# Patient Record
Sex: FEMALE | Race: WHITE | NOT HISPANIC OR LATINO | Employment: OTHER | URBAN - METROPOLITAN AREA
[De-identification: names, ages, dates, MRNs, and addresses within clinical notes are randomized per-mention and may not be internally consistent; named-entity substitution may affect disease eponyms.]

---

## 2017-01-13 ENCOUNTER — ALLSCRIPTS OFFICE VISIT (OUTPATIENT)
Dept: OTHER | Facility: OTHER | Age: 82
End: 2017-01-13

## 2017-02-11 ENCOUNTER — OFFICE VISIT (OUTPATIENT)
Dept: URGENT CARE | Facility: MEDICAL CENTER | Age: 82
End: 2017-02-11
Payer: MEDICARE

## 2017-02-11 PROCEDURE — G0463 HOSPITAL OUTPT CLINIC VISIT: HCPCS

## 2017-02-11 PROCEDURE — 99213 OFFICE O/P EST LOW 20 MIN: CPT

## 2017-04-13 DIAGNOSIS — E78.5 HYPERLIPIDEMIA: ICD-10-CM

## 2017-04-13 DIAGNOSIS — I48.91 ATRIAL FIBRILLATION (HCC): ICD-10-CM

## 2017-04-13 DIAGNOSIS — I10 ESSENTIAL (PRIMARY) HYPERTENSION: ICD-10-CM

## 2017-07-24 ENCOUNTER — ALLSCRIPTS OFFICE VISIT (OUTPATIENT)
Dept: OTHER | Facility: OTHER | Age: 82
End: 2017-07-24

## 2017-09-21 ENCOUNTER — TRANSCRIBE ORDERS (OUTPATIENT)
Dept: ADMINISTRATIVE | Facility: HOSPITAL | Age: 82
End: 2017-09-21

## 2017-09-21 DIAGNOSIS — Z12.31 VISIT FOR SCREENING MAMMOGRAM: Primary | ICD-10-CM

## 2017-10-19 ENCOUNTER — HOSPITAL ENCOUNTER (OUTPATIENT)
Dept: RADIOLOGY | Age: 82
Discharge: HOME | End: 2017-10-19
Payer: MEDICARE

## 2017-10-19 DIAGNOSIS — Z12.31 VISIT FOR SCREENING MAMMOGRAM: ICD-10-CM

## 2017-10-19 PROCEDURE — G0202 SCR MAMMO BI INCL CAD: HCPCS

## 2018-01-11 NOTE — PROGRESS NOTES
Assessment  Assessed    1  Atrial fibrillation (427 31) (I48 91)   2  Hyperlipidemia (272 4) (E78 5)   3  Hypertension (401 9) (I10)    Plan  Atrial fibrillation, Hyperlipidemia, Hypertension    · Follow-up visit in 6 months Evaluation and Treatment  Follow-up  Status: Hold For -  Scheduling  Requested for: 21Jan2016   Ordered; For: Atrial fibrillation, Hyperlipidemia, Hypertension; Ordered By: Delio Casillas Performed:  Due: [id number]    Discussion/Summary  Cardiology Discussion Summary Free Text Note Form [facility]:   I will continue his present medical regimen  I have asked her to call me if there is a problem in the interim  I will see her in followup in 6 months time  Chief Complaint  Chief Complaint Free Text Note Form: Pt  here for 6 month cardiology followup  Pt  has occasional SOB-while in bed seems to occur mostly and occasional lightheadedness  No other cardiac complaints  History of Present Illness  Cardiology HPI Free Text Note Form [facility]: Patient is here for followup visit  She was last seen by me in July  Since that time she has done quite well  She recently had carpal tunnel surgery and trigger finger surgery  She recovered very nicely from this  Her recent lipid profile looked quite good  Atrial Fibrillation (Follow-Up): The patient presents with paroxysmal atrial fibrillation  The treatment strategy for this patient is rate control  She states her atrial fibrillation has been well controlled since the last visit  Symptoms:   Hyperlipidemia (Follow-Up): The patient states her hyperlipidemia has been under good control since the last visit  She has no significant interval events  Symptoms: The patient is currently asymptomatic  Hypertension (Follow-Up): The patient presents for follow-up of essential hypertension  The patient states she has been doing well with her blood pressure control since the last visit  She has no significant interval events     Symptoms: The patient is currently asymptomatic  Review of Systems  Cardiology Female ROS:     Cardiac: No complaints of chest pain, no palpitations, no fainting  Skin: No complaints of nonhealing sores or skin rash  Genitourinary: No complaints of recurrent urinary tract infections, frequent urination at night, difficult urination, blood in urine, kidney stones, loss of bladder control, kidney problems, denies any birth control or hormone replacement, is not post menopausal, not currently pregnant  Psychological: No complaints of feeling depressed, anxiety, panic attacks, or difficulty concentrating  General: No complaints of trouble sleeping, lack of energy, fatigue, appetite changes, weight changes, fever, frequent infections, or night sweats  Respiratory: No complaints of shortness of breath, cough with sputum, or wheezing  HEENT: No complaints of serious problems, hearing problems, nose problems, throat problems, or snoring  Gastrointestinal: No complaints of liver problems, nausea, vomiting, heartburn, constipation, bloody stools, diarrhea, problems swallowing, adbominal pain, or rectal bleeding  Hematologic: No complaints of bleeding disorders, anemia, blood clots, or excessive brusing  Neurological: No complaints of numbness, tingling, dizziness, weakness, seizures, headaches, syncope or fainting, AM fatigue, daytime sleepiness, no witnessed apnea episodes  Musculoskeletal: No complaints of arthritis, back pain, or painfull swelling  Active Problems  Problems    1  Aortic valve disease (424 1) (I35 9)   2  Atrial fibrillation (427 31) (I48 91)   3  Esophageal reflux (530 81) (K21 9)   4  Hiatal hernia (553 3) (K44 9)   5  Hyperlipidemia (272 4) (E78 5)   6  Hypertension (401 9) (I10)    Past Medical History  Active Problems And Past Medical History Reviewed: The active problems and past medical history were reviewed and updated today  Surgical History  Problems    1  History of Appendectomy   2  History of Ovarian Cystectomy    Family History  Mother    1  Family history of hypertension (V17 49) (Z82 49)  Father    2  Family history of asthma (V17 5) (Z82 5)  Sister    3  Family history of hypertension (V17 49) (Z82 49)    Social History  Problems    · Never a smoker    Current Meds   1  Aspirin 81 MG Oral Tablet; TAKE 1 TABLET DAILY; Therapy: (Charbel Santos) to Recorded   2  Daily Multivitamin TABS; Therapy: (Charbel Santos) to Recorded   3  Metoprolol Succinate ER 50 MG Oral Tablet Extended Release 24 Hour; TAKE 50 MG   Every morning 25mg PM TDD:75MG  Requested for: [id number]; Last Rx:22Jun2015   Ordered   4  Protonix 40 MG Oral Tablet Delayed Release; TAKE 1 TABLET  PRN; Therapy: (Charbel Santos) to Recorded   5  Simvastatin 10 MG Oral Tablet; TAKE 1 TABLET Every other day Mon-wed-fri  Requested   for: [id number]; Last Rx:[id number] Ordered   6  Vitamin D TABS; TAKE TABLET Daily; Therapy: (Recorded:21Jan2016) to Recorded  Medication List Reviewed: The medication list was reviewed and updated today  Allergies  Medication    1  epinephrine   2  Sulfa Drugs    Vitals  Vital Signs [Data Includes: Current Encounter]    Recorded: 21Jan2016 09:53AM   Heart Rate 84, R Radial   Pulse Quality Regular, R Radial   Systolic 609, LUE, Sitting   Diastolic 76, LUE, Sitting   BP Cuff Size Large   Height 5 ft 3 in   Weight 186 lb 5 oz   BMI Calculated 33   BSA Calculated 1 88     Physical Exam    Constitutional   General appearance: No acute distress, well appearing and well nourished  Eyes   Conjunctiva and Sclera examination: Conjunctiva pink, sclera anicteric  Neck   Neck and thyroid: Normal, supple, trachea midline, no thyromegaly  Pulmonary   Respiratory effort: No increased work of breathing or signs of respiratory distress  Auscultation of lungs: Clear to auscultation, no rales, no rhonchi, no wheezing, good air movement      Cardiovascular   Palpation of heart: Normal PMI, no thrills  Auscultation of heart: Normal rate and rhythm, normal S1 and S2, no murmurs  Carotid pulses: Normal, 2+ bilaterally  Examination of extremities for edema and/or varicosities: Normal     Chest - Chest: Normal    Musculoskeletal Gait and station: Normal gait  Digits and nails: Normal without clubbing or cyanosis     Neurologic - Speech: Normal     Psychiatric - Orientation to person, place, and time: Normal  Mood and affect: Normal       Signatures   Electronically signed by : HALINA Lawson ; Jan 21 2016 10:17AM EST                       (Author)

## 2018-01-14 VITALS
SYSTOLIC BLOOD PRESSURE: 128 MMHG | WEIGHT: 181 LBS | HEART RATE: 78 BPM | BODY MASS INDEX: 32.07 KG/M2 | HEIGHT: 63 IN | DIASTOLIC BLOOD PRESSURE: 74 MMHG

## 2018-01-14 VITALS
HEIGHT: 63 IN | DIASTOLIC BLOOD PRESSURE: 58 MMHG | SYSTOLIC BLOOD PRESSURE: 120 MMHG | BODY MASS INDEX: 32.43 KG/M2 | WEIGHT: 183 LBS | HEART RATE: 90 BPM

## 2018-05-09 ENCOUNTER — OFFICE VISIT (OUTPATIENT)
Dept: UROLOGY | Facility: CLINIC | Age: 83
End: 2018-05-09
Payer: MEDICARE

## 2018-05-09 VITALS
BODY MASS INDEX: 32.43 KG/M2 | WEIGHT: 183 LBS | DIASTOLIC BLOOD PRESSURE: 79 MMHG | SYSTOLIC BLOOD PRESSURE: 118 MMHG | HEIGHT: 63 IN

## 2018-05-09 DIAGNOSIS — R31.29 MICROSCOPIC HEMATURIA: ICD-10-CM

## 2018-05-09 DIAGNOSIS — N20.0 CALCULUS OF KIDNEY: Primary | ICD-10-CM

## 2018-05-09 LAB
SL AMB  POCT GLUCOSE, UA: ABNORMAL
SL AMB LEUKOCYTE ESTERASE,UA: ABNORMAL
SL AMB POCT BLOOD,UA: ABNORMAL
SL AMB POCT CLARITY,UA: CLEAR
SL AMB POCT COLOR,UA: YELLOW
SL AMB POCT KETONES,UA: ABNORMAL
SL AMB POCT NITRITE,UA: ABNORMAL
SL AMB POCT PH,UA: 5
SL AMB POCT URINE PROTEIN: ABNORMAL

## 2018-05-09 PROCEDURE — 81002 URINALYSIS NONAUTO W/O SCOPE: CPT | Performed by: UROLOGY

## 2018-05-09 PROCEDURE — 99213 OFFICE O/P EST LOW 20 MIN: CPT | Performed by: UROLOGY

## 2018-05-09 RX ORDER — METOPROLOL SUCCINATE 50 MG/1
TABLET, EXTENDED RELEASE ORAL
COMMUNITY
Start: 2015-12-22 | End: 2018-09-24 | Stop reason: SDUPTHER

## 2018-05-11 NOTE — PROGRESS NOTES
Progress Note - Urology  Stefania Reynolds 80 y o  female MRN: [id number]  Encounter: [id number]      Chief Complaint:   Chief Complaint   Patient presents with    Kidney Stone     1 Yr Follow Up/ KUB was done @ [facility]       HPI:    This 28-year-old female had been seen previously for microscopic hematuria as well as renal lithiasis  CT scans have revealed multiple small stones bilaterally  Her most recent KUB only shows a 4 mm stone on the left  She is asymptomatic  She denies having passed any stones  She has no gross hematuria  She has been evaluated in the past for microscopic hematuria  There is no weight loss  She has no other urologic complaints      MEDS:    Current Outpatient Prescriptions:     aspirin 81 MG tablet, Take 1 tablet by mouth daily, Disp: , Rfl:     cholecalciferol (VITAMIN D3) 1,000 units tablet, Take by mouth daily, Disp: , Rfl:     metoprolol succinate (TOPROL-XL) 50 mg 24 hr tablet, 50MG IN AM AND 75 MG IN PM , Disp: , Rfl:     Multiple Vitamin (DAILY VALUE MULTIVITAMIN) TABS, Take by mouth, Disp: , Rfl:     simvastatin (ZOCOR) 10 mg tablet, Take by mouth, Disp: , Rfl:       PMH:  Past Medical History:   Diagnosis Date    Basal cell carcinoma     Kidney stone          ROS:  Review of Systems      Vitals:  Blood pressure 118/79, height 5' 3" (1 6 m), weight 83 kg (183 lb)  Physical Exam:    elderly white female no acute distress  Her back shows no CVA tenderness  The abdomen is soft  I do not appreciate tenderness or mass  Bladder is not distended  Vaginal introitus and she unremarkable she does have urethral caruncle  Lab, Imaging and other studies:  No results found for this or any previous visit (from the past 48 hour(s))  IMPRESSION:    1  Micro hematuria   2  History renal lithiasis    PLAN:   continue conservative approach of observation  She will contact me should there be any flank pain    She will contact me should there be a change of her urinary pattern or gross hematuria

## 2018-05-17 ENCOUNTER — OFFICE VISIT (OUTPATIENT)
Dept: CARDIOLOGY CLINIC | Facility: CLINIC | Age: 83
End: 2018-05-17
Payer: MEDICARE

## 2018-05-17 VITALS
DIASTOLIC BLOOD PRESSURE: 66 MMHG | HEART RATE: 82 BPM | BODY MASS INDEX: 32.25 KG/M2 | WEIGHT: 182 LBS | SYSTOLIC BLOOD PRESSURE: 120 MMHG | HEIGHT: 63 IN

## 2018-05-17 DIAGNOSIS — I48.91 ATRIAL FIBRILLATION, UNSPECIFIED TYPE (HCC): ICD-10-CM

## 2018-05-17 DIAGNOSIS — I10 ESSENTIAL (PRIMARY) HYPERTENSION: Primary | ICD-10-CM

## 2018-05-17 DIAGNOSIS — E78.00 PURE HYPERCHOLESTEROLEMIA: ICD-10-CM

## 2018-05-17 PROCEDURE — 93000 ELECTROCARDIOGRAM COMPLETE: CPT | Performed by: INTERNAL MEDICINE

## 2018-05-17 PROCEDURE — 99214 OFFICE O/P EST MOD 30 MIN: CPT | Performed by: INTERNAL MEDICINE

## 2018-05-17 NOTE — PROGRESS NOTES
Cardiology Follow Up    Syeda Ramirez  1933  [id number]  [facility]  [address] [place] [address] [address]    1  Essential (primary) hypertension     2  Pure hypercholesterolemia     3  Atrial fibrillation, unspecified type ([address] )         Interval History:  Patient is here for a follow-up visit  She was last seen by me in July of last year  Her most recent echocardiogram was done 2016 and demonstrated preserved LV systolic function with mild LVH and no significant valvular heart disease  She has a prior history of paroxysmal atrial fibrillation  She had a nuclear stress test done with exercise 2012 which showed no evidence of prognostically important ischemia  She has been doing reasonably well  She does have anxiety in reference to her 's illness  She occasionally has some shortness of breath at night  She would like blood work done  She is due for an echocardiogram     There is no problem list on file for this patient      Past Medical History:   Diagnosis Date    Basal cell carcinoma     Kidney stone      Social History     Social History    Marital status: /Civil Union     Spouse name: N/A    Number of children: N/A    Years of education: N/A     Occupational History    Retired      Social History Main Topics    Smoking status: Never Smoker    Smokeless tobacco: Never Used    Alcohol use Yes      Comment: Occasional wine    Drug use: No    Sexual activity: Not on file     Other Topics Concern    Not on file     Social History Narrative    No narrative on file      Family History   Problem Relation Age of Onset    Prostate cancer Father      Past Surgical History:   Procedure Laterality Date    APPENDECTOMY      CARPAL TUNNEL RELEASE Right 2016    CATARACT EXTRACTION Left     OVARIAN CYST REMOVAL      PARTIAL HYSTERECTOMY      TRIGGER FINGER RELEASE Current Outpatient Prescriptions:     aspirin 81 MG tablet, Take 1 tablet by mouth daily, Disp: , Rfl:     cholecalciferol (VITAMIN D3) 1,000 units tablet, Take by mouth daily, Disp: , Rfl:     metoprolol succinate (TOPROL-XL) 50 mg 24 hr tablet, 50MG IN AM AND 75 MG IN PM , Disp: , Rfl:     Multiple Vitamin (DAILY VALUE MULTIVITAMIN) TABS, Take by mouth, Disp: , Rfl:     simvastatin (ZOCOR) 10 mg tablet, Take by mouth, Disp: , Rfl:   Allergies   Allergen Reactions    Amoxicillin Rash    Epinephrine Other (See Comments)     Other reaction(s): Rapid Heart Rate  Increased Heart Rate, Palpitations    Procaine     Sulfa Antibiotics Rash     Other reaction(s): Other (See Comments)  unknown       Labs:not applicable  Imaging: No results found  Review of Systems:  Review of Systems   Cardiovascular:        Occasional dyspnea at night  All other systems reviewed and are negative  Physical Exam:  Physical Exam   Constitutional: She is oriented to person, place, and time  She appears well-developed and well-nourished  HENT:   Head: Normocephalic and atraumatic  Eyes: Conjunctivae and EOM are normal  Pupils are equal, round, and reactive to light  Neck: Normal range of motion  Neck supple  Cardiovascular: Normal rate and normal heart sounds  Pulmonary/Chest: Effort normal and breath sounds normal    Neurological: She is alert and oriented to person, place, and time  Skin: Skin is warm and dry  Psychiatric: She has a normal mood and affect  Vitals reviewed  Discussion/Summary:  I will check blood work and check an echocardiogram to assess LV wall thickness and systolic function  Her EKG today looks good  I have asked her to call if there is a problem in the interim otherwise I will see her in follow-up in six months time

## 2018-05-17 NOTE — PATIENT INSTRUCTIONS
I will check blood work and I will order an echocardiogram   Please call if there is a problem in the interim  I will see you at her follow-up visit

## 2018-06-28 ENCOUNTER — HOSPITAL ENCOUNTER (OUTPATIENT)
Dept: NON INVASIVE DIAGNOSTICS | Facility: MEDICAL CENTER | Age: 83
Discharge: HOME | End: 2018-06-28
Payer: MEDICARE

## 2018-06-28 DIAGNOSIS — I48.91 ATRIAL FIBRILLATION, UNSPECIFIED TYPE (HCC): ICD-10-CM

## 2018-06-28 DIAGNOSIS — I10 ESSENTIAL (PRIMARY) HYPERTENSION: ICD-10-CM

## 2018-06-28 PROCEDURE — 93306 TTE W/DOPPLER COMPLETE: CPT

## 2018-06-28 PROCEDURE — 93306 TTE W/DOPPLER COMPLETE: CPT | Performed by: INTERNAL MEDICINE

## 2018-09-24 DIAGNOSIS — I10 ESSENTIAL (PRIMARY) HYPERTENSION: Primary | ICD-10-CM

## 2018-09-24 RX ORDER — METOPROLOL SUCCINATE 50 MG/1
TABLET, EXTENDED RELEASE ORAL
Qty: 135 TABLET | Refills: 3 | Status: SHIPPED | OUTPATIENT
Start: 2018-09-24 | End: 2019-09-19 | Stop reason: SDUPTHER

## 2018-10-25 ENCOUNTER — HOSPITAL ENCOUNTER (OUTPATIENT)
Dept: RADIOLOGY | Age: 83
Discharge: HOME | End: 2018-10-25
Payer: MEDICARE

## 2018-10-25 DIAGNOSIS — Z12.31 ENCOUNTER FOR SCREENING MAMMOGRAM FOR MALIGNANT NEOPLASM OF BREAST: ICD-10-CM

## 2018-10-25 PROCEDURE — 77067 SCR MAMMO BI INCL CAD: CPT

## 2018-10-25 PROCEDURE — 77063 BREAST TOMOSYNTHESIS BI: CPT

## 2018-11-06 DIAGNOSIS — E78.00 PURE HYPERCHOLESTEROLEMIA: Primary | ICD-10-CM

## 2018-11-06 RX ORDER — SIMVASTATIN 10 MG
TABLET ORAL
Qty: 30 TABLET | Refills: 3 | Status: SHIPPED | OUTPATIENT
Start: 2018-11-06 | End: 2019-08-11 | Stop reason: SDUPTHER

## 2018-12-07 NOTE — PROGRESS NOTES
Cardiology Follow Up    Guanako Butler  1933  [id number]  [address] [facility] [address]    1  Pure hypercholesterolemia     2  Essential (primary) hypertension     3  Atrial fibrillation, unspecified type ([address] )         Interval History:  Patient is here for a follow-up visit  She was last seen by me in May  She has a prior history of paroxysmal atrial fibrillation which is well controlled on metoprolol succinate  She is on aspirin therapy  She had an exercise nuclear stress test done August 2012 which showed no evidence of prognostically important ischemia  Her most recent echocardiogram was done June of this year demonstrated preserved LV systolic function with mild aortic regurgitation  She has been doing well  She has had no chest pain or significant dyspnea  There is no problem list on file for this patient      Past Medical History:   Diagnosis Date    Basal cell carcinoma     Kidney stone      Social History     Social History    Marital status: /Civil Union     Spouse name: N/A    Number of children: N/A    Years of education: N/A     Occupational History    Retired      Social History Main Topics    Smoking status: Never Smoker    Smokeless tobacco: Never Used    Alcohol use Yes      Comment: Occasional wine    Drug use: No    Sexual activity: Not on file     Other Topics Concern    Not on file     Social History Narrative    No narrative on file      Family History   Problem Relation Age of Onset    Prostate cancer Father      Past Surgical History:   Procedure Laterality Date    APPENDECTOMY      CARPAL TUNNEL RELEASE Right 2016    CATARACT EXTRACTION Left     OVARIAN CYST REMOVAL      PARTIAL HYSTERECTOMY      TRIGGER FINGER RELEASE         Current Outpatient Prescriptions:     aspirin 81 MG tablet, Take 1 tablet by mouth daily, Disp: , Rfl:     cholecalciferol (VITAMIN D3) 1,000 units tablet, Take by mouth daily, Disp: , Rfl:     metoprolol succinate (TOPROL-XL) 50 mg 24 hr tablet, TAKE 1 TABLET BY MOUTH EVERY MORNING AND TAKE 1/2 TABLET BY MOUTH EVERY EVENING, Disp: 135 tablet, Rfl: 3    Multiple Vitamin (DAILY VALUE MULTIVITAMIN) TABS, Take by mouth, Disp: , Rfl:     neomycin-polymyxin-dexamethasone (MAXITROL) 0 35%-10,000 XGYUP/Q-3 5%, 1 APPLICATION IN THE LEFT EYE THREE TIMES DAILY, Disp: , Rfl: 0    simvastatin (ZOCOR) 10 mg tablet, TAKE ONE TABLET BY MOUTH EVERY OTHER DAY ON MONDAY, WEDNESDAY, AND FRIDAY, Disp: 30 tablet, Rfl: 3  Allergies   Allergen Reactions    Amoxicillin Rash    Epinephrine Other (See Comments)     Other reaction(s): Rapid Heart Rate  Increased Heart Rate, Palpitations    Procaine     Sulfa Antibiotics Rash and Other (See Comments)     unknown  Other reaction(s): Other (See Comments)  unknown       Labs:not applicable  Imaging: No results found  Review of Systems:  Review of Systems   All other systems reviewed and are negative  Physical Exam:  /88 (BP Location: Right arm, Patient Position: Sitting, Cuff Size: Standard)   Pulse 72   Ht 5' 3" (1 6 m)   Wt 82 6 kg (182 lb)   BMI 32 24 kg/m²   Physical Exam   Constitutional: She is oriented to person, place, and time  She appears well-developed and well-nourished  HENT:   Head: Normocephalic and atraumatic  Eyes: Pupils are equal, round, and reactive to light  Conjunctivae and EOM are normal    Neck: Normal range of motion  Neck supple  Cardiovascular: Normal rate and normal heart sounds  Pulmonary/Chest: Effort normal and breath sounds normal    Neurological: She is alert and oriented to person, place, and time  Skin: Skin is warm and dry  Psychiatric: She has a normal mood and affect  Vitals reviewed  Discussion/Summary:I will continue the patient's present medical regimen  The patient appears well compensated    I have asked the patient to call if there is a problem in the interim otherwise I will see the patient in six months time

## 2018-12-12 ENCOUNTER — OFFICE VISIT (OUTPATIENT)
Dept: CARDIOLOGY CLINIC | Facility: CLINIC | Age: 83
End: 2018-12-12
Payer: MEDICARE

## 2018-12-12 VITALS
WEIGHT: 182 LBS | DIASTOLIC BLOOD PRESSURE: 88 MMHG | SYSTOLIC BLOOD PRESSURE: 148 MMHG | HEART RATE: 72 BPM | BODY MASS INDEX: 32.25 KG/M2 | HEIGHT: 63 IN

## 2018-12-12 DIAGNOSIS — E78.00 PURE HYPERCHOLESTEROLEMIA: Primary | ICD-10-CM

## 2018-12-12 DIAGNOSIS — I10 ESSENTIAL (PRIMARY) HYPERTENSION: ICD-10-CM

## 2018-12-12 DIAGNOSIS — I48.91 ATRIAL FIBRILLATION, UNSPECIFIED TYPE (HCC): ICD-10-CM

## 2018-12-12 PROCEDURE — 99214 OFFICE O/P EST MOD 30 MIN: CPT | Performed by: INTERNAL MEDICINE

## 2018-12-12 RX ORDER — NEOMYCIN SULFATE, POLYMYXIN B SULFATE, AND DEXAMETHASONE 3.5; 10000; 1 MG/G; [USP'U]/G; MG/G
OINTMENT OPHTHALMIC
Refills: 0 | COMMUNITY
Start: 2018-11-13 | End: 2020-01-30

## 2019-05-21 ENCOUNTER — OFFICE VISIT (OUTPATIENT)
Dept: UROLOGY | Facility: CLINIC | Age: 84
End: 2019-05-21
Payer: MEDICARE

## 2019-05-21 VITALS
HEART RATE: 77 BPM | WEIGHT: 182 LBS | DIASTOLIC BLOOD PRESSURE: 72 MMHG | HEIGHT: 63 IN | BODY MASS INDEX: 32.25 KG/M2 | SYSTOLIC BLOOD PRESSURE: 126 MMHG

## 2019-05-21 DIAGNOSIS — N20.0 CALCULUS OF KIDNEY: Primary | ICD-10-CM

## 2019-05-21 PROCEDURE — 99213 OFFICE O/P EST LOW 20 MIN: CPT | Performed by: UROLOGY

## 2019-06-03 ENCOUNTER — OFFICE VISIT (OUTPATIENT)
Dept: CARDIOLOGY CLINIC | Facility: CLINIC | Age: 84
End: 2019-06-03
Payer: MEDICARE

## 2019-06-03 VITALS
BODY MASS INDEX: 32.57 KG/M2 | HEART RATE: 75 BPM | SYSTOLIC BLOOD PRESSURE: 128 MMHG | WEIGHT: 183.8 LBS | DIASTOLIC BLOOD PRESSURE: 72 MMHG | HEIGHT: 63 IN | OXYGEN SATURATION: 95 %

## 2019-06-03 DIAGNOSIS — I10 ESSENTIAL (PRIMARY) HYPERTENSION: ICD-10-CM

## 2019-06-03 DIAGNOSIS — E78.00 PURE HYPERCHOLESTEROLEMIA: ICD-10-CM

## 2019-06-03 DIAGNOSIS — I48.91 ATRIAL FIBRILLATION, UNSPECIFIED TYPE (HCC): Primary | ICD-10-CM

## 2019-06-03 PROCEDURE — 99214 OFFICE O/P EST MOD 30 MIN: CPT | Performed by: INTERNAL MEDICINE

## 2019-06-03 PROCEDURE — 93000 ELECTROCARDIOGRAM COMPLETE: CPT | Performed by: INTERNAL MEDICINE

## 2019-08-11 DIAGNOSIS — E78.00 PURE HYPERCHOLESTEROLEMIA: ICD-10-CM

## 2019-08-11 RX ORDER — SIMVASTATIN 10 MG
TABLET ORAL
Qty: 30 TABLET | Refills: 3 | Status: SHIPPED | OUTPATIENT
Start: 2019-08-11 | End: 2020-05-10

## 2019-09-19 DIAGNOSIS — I10 ESSENTIAL (PRIMARY) HYPERTENSION: ICD-10-CM

## 2019-09-19 RX ORDER — METOPROLOL SUCCINATE 50 MG/1
TABLET, EXTENDED RELEASE ORAL
Qty: 135 TABLET | Refills: 3 | Status: SHIPPED | OUTPATIENT
Start: 2019-09-19 | End: 2020-09-21

## 2019-10-31 ENCOUNTER — HOSPITAL ENCOUNTER (OUTPATIENT)
Dept: RADIOLOGY | Age: 84
Discharge: HOME | End: 2019-10-31
Payer: MEDICARE

## 2019-10-31 VITALS — HEIGHT: 63 IN | WEIGHT: 185 LBS | BODY MASS INDEX: 32.78 KG/M2

## 2019-10-31 DIAGNOSIS — Z12.31 ENCOUNTER FOR SCREENING MAMMOGRAM FOR MALIGNANT NEOPLASM OF BREAST: ICD-10-CM

## 2019-10-31 PROCEDURE — 77067 SCR MAMMO BI INCL CAD: CPT

## 2019-10-31 PROCEDURE — 77063 BREAST TOMOSYNTHESIS BI: CPT

## 2019-11-29 ENCOUNTER — TELEPHONE (OUTPATIENT)
Dept: CARDIOLOGY CLINIC | Facility: CLINIC | Age: 84
End: 2019-11-29

## 2019-11-29 NOTE — TELEPHONE ENCOUNTER
Called and spoke with Olivia Langley from patient's PCP's office  Requested recent labs be faxed over  Olivia Langley stated their systems are down, but will fax over labs on Monday

## 2020-01-23 LAB — HBA1C MFR BLD HPLC: 6 %

## 2020-01-25 NOTE — PROGRESS NOTES
Cardiology Follow Up    Sung Hill  1933  [id number]  [address] [place] [address]  [facility]  [address]  [address]  [phone]  [phone]    1  Essential (primary) hypertension     2  Pure hypercholesterolemia     3  Atrial fibrillation, unspecified type ([address] )         Interval History: Patient is here for a follow-up visit  She has a prior history of paroxysmal atrial fibrillation which is well controlled on metoprolol succinate  She is on aspirin therapy  She had a exercise nuclear stress test done October 2012 which showed no evidence of prognostically important ischemia  Her most recent echocardiogram done June 2018 demonstrated preserved LV systolic function with no significant valvular heart disease  The patient's vital signs today are stable  Patient has had no chest pain  She has been doing well  She does tell me that her older sister has had heart difficulty and had stents placed  Patient herself is not had any issue  There is no problem list on file for this patient      Past Medical History:   Diagnosis Date    Basal cell carcinoma     Kidney stone      Social History     Socioeconomic History    Marital status: /Civil Union     Spouse name: Not on file    Number of children: Not on file    Years of education: Not on file    Highest education level: Not on file   Occupational History    Occupation: Retired   Social Needs    Financial resource strain: Not on file    Food insecurity:     Worry: Not on file     Inability: Not on file   Stem[employer] needs:     Medical: Not on file     Non-medical: Not on file   Tobacco Use    Smoking status: Never Smoker    Smokeless tobacco: Never Used   Substance and Sexual Activity    Alcohol use: Yes     Comment: Occasional wine    Drug use: No    Sexual activity: Not on file   Lifestyle    Physical activity:     Days per week: Not on file Minutes per session: Not on file    Stress: Not on file   Relationships    Social connections:     Talks on phone: Not on file     Gets together: Not on file     Attends [employer] service: Not on file     Active member of club or organization: Not on file     Attends meetings of clubs or organizations: Not on file     Relationship status: Not on file    Intimate partner violence:     Fear of current or ex partner: Not on file     Emotionally abused: Not on file     Physically abused: Not on file     Forced sexual activity: Not on file   Other Topics Concern    Not on file   Social History Narrative    Not on file      Family History   Problem Relation Age of Onset    Prostate cancer Father 70    Pancreatic cancer Mother [de-identified]    Breast cancer Sister 48    No Known Problems Daughter     No Known Problems Maternal Grandmother     No Known Problems Maternal Grandfather     No Known Problems Paternal Grandmother     No Known Problems Paternal Grandfather     No Known Problems Sister     No Known Problems Sister     No Known Problems Sister     No Known Problems Maternal Aunt      Past Surgical History:   Procedure Laterality Date    APPENDECTOMY      BREAST BIOPSY Right     core    CARPAL TUNNEL RELEASE Right 2016    CATARACT EXTRACTION Left     OVARIAN CYST REMOVAL      PARTIAL HYSTERECTOMY      TRIGGER FINGER RELEASE         Current Outpatient Medications:     aspirin 81 MG tablet, Take 1 tablet by mouth daily, Disp: , Rfl:     cholecalciferol (VITAMIN D3) 1,000 units tablet, Take by mouth daily, Disp: , Rfl:     metoprolol succinate (TOPROL-XL) 50 mg 24 hr tablet, TAKE 1 TABLET BY MOUTH EVERY MORNING AND TAKE 1/2 TABLET BY MOUTH EVERY EVENING, Disp: 135 tablet, Rfl: 3    Multiple Vitamin (DAILY VALUE MULTIVITAMIN) TABS, Take by mouth, Disp: , Rfl:     simvastatin (ZOCOR) 10 mg tablet, TAKE ONE TABLET BY MOUTH EVERY OTHER DAY ON MONDAY, WEDNESDAY, AND FRIDAY, Disp: 30 tablet, Rfl: 3  Allergies   Allergen Reactions    Amoxicillin Rash     Later determined heat rash - has taken Amoxicillin since w/o reaction    Epinephrine Other (See Comments)     Other reaction(s): Rapid Heart Rate  Increased Heart Rate, Palpitations    Procaine     Sulfa Antibiotics Rash and Other (See Comments)     unknown  Other reaction(s): Other (See Comments)  unknown       Labs:not applicable  Imaging: No results found  Review of Systems:  Review of Systems   All other systems reviewed and are negative  Physical Exam:  /72 (BP Location: Right arm, Cuff Size: Large)   Pulse 80   Ht 5' 3" (1 6 m)   Wt 84 8 kg (187 lb)   BMI 33 13 kg/m²   Physical Exam   Constitutional: She is oriented to person, place, and time  She appears well-developed and well-nourished  HENT:   Head: Normocephalic and atraumatic  Eyes: Pupils are equal, round, and reactive to light  Conjunctivae and EOM are normal    Neck: Normal range of motion  Neck supple  Cardiovascular: Normal rate and normal heart sounds  Pulmonary/Chest: Effort normal and breath sounds normal    Neurological: She is alert and oriented to person, place, and time  Skin: Skin is warm and dry  Psychiatric: She has a normal mood and affect  Vitals reviewed  Discussion/Summary:I will continue the patient's present medical regimen  The patient appears well compensated  I have asked the patient to call if there is a problem in the interim otherwise I will see the patient in six months time

## 2020-01-30 ENCOUNTER — OFFICE VISIT (OUTPATIENT)
Dept: CARDIOLOGY CLINIC | Facility: CLINIC | Age: 85
End: 2020-01-30
Payer: MEDICARE

## 2020-01-30 VITALS
BODY MASS INDEX: 33.13 KG/M2 | HEIGHT: 63 IN | HEART RATE: 80 BPM | DIASTOLIC BLOOD PRESSURE: 72 MMHG | WEIGHT: 187 LBS | SYSTOLIC BLOOD PRESSURE: 128 MMHG

## 2020-01-30 DIAGNOSIS — I10 ESSENTIAL (PRIMARY) HYPERTENSION: Primary | ICD-10-CM

## 2020-01-30 DIAGNOSIS — I48.91 ATRIAL FIBRILLATION, UNSPECIFIED TYPE (HCC): ICD-10-CM

## 2020-01-30 DIAGNOSIS — E78.00 PURE HYPERCHOLESTEROLEMIA: ICD-10-CM

## 2020-01-30 PROCEDURE — 99214 OFFICE O/P EST MOD 30 MIN: CPT | Performed by: INTERNAL MEDICINE

## 2020-05-10 DIAGNOSIS — E78.00 PURE HYPERCHOLESTEROLEMIA: ICD-10-CM

## 2020-05-10 RX ORDER — SIMVASTATIN 10 MG
TABLET ORAL
Qty: 30 TABLET | Refills: 3 | Status: SHIPPED | OUTPATIENT
Start: 2020-05-10 | End: 2021-02-17

## 2020-05-19 ENCOUNTER — TELEPHONE (OUTPATIENT)
Dept: UROLOGY | Facility: MEDICAL CENTER | Age: 85
End: 2020-05-19

## 2020-05-19 DIAGNOSIS — N20.0 KIDNEY STONES: Primary | ICD-10-CM

## 2020-05-28 ENCOUNTER — HOSPITAL ENCOUNTER (OUTPATIENT)
Dept: RADIOLOGY | Facility: HOSPITAL | Age: 85
Discharge: HOME | End: 2020-05-28
Payer: MEDICARE

## 2020-05-28 DIAGNOSIS — N20.0 KIDNEY STONES: ICD-10-CM

## 2020-05-28 PROCEDURE — 74018 RADEX ABDOMEN 1 VIEW: CPT

## 2020-05-29 ENCOUNTER — TELEPHONE (OUTPATIENT)
Dept: UROLOGY | Facility: CLINIC | Age: 85
End: 2020-05-29

## 2020-06-08 ENCOUNTER — TELEMEDICINE (OUTPATIENT)
Dept: UROLOGY | Facility: CLINIC | Age: 85
End: 2020-06-08
Payer: MEDICARE

## 2020-06-08 DIAGNOSIS — N20.0 KIDNEY STONES: Primary | ICD-10-CM

## 2020-06-08 PROCEDURE — 99442 PR PHYS/QHP TELEPHONE EVALUATION 11-20 MIN: CPT | Performed by: PHYSICIAN ASSISTANT

## 2020-09-20 DIAGNOSIS — I10 ESSENTIAL (PRIMARY) HYPERTENSION: ICD-10-CM

## 2020-09-21 RX ORDER — METOPROLOL SUCCINATE 50 MG/1
TABLET, EXTENDED RELEASE ORAL
Qty: 135 TABLET | Refills: 3 | Status: SHIPPED | OUTPATIENT
Start: 2020-09-21 | End: 2021-09-20

## 2021-02-17 DIAGNOSIS — E78.00 PURE HYPERCHOLESTEROLEMIA: ICD-10-CM

## 2021-02-17 RX ORDER — SIMVASTATIN 10 MG
TABLET ORAL
Qty: 30 TABLET | Refills: 3 | Status: SHIPPED | OUTPATIENT
Start: 2021-02-17 | End: 2021-11-27

## 2021-03-02 DIAGNOSIS — Z23 ENCOUNTER FOR IMMUNIZATION: ICD-10-CM

## 2021-03-04 ENCOUNTER — IMMUNIZATIONS (OUTPATIENT)
Dept: FAMILY MEDICINE CLINIC | Facility: HOSPITAL | Age: 86
End: 2021-03-04

## 2021-03-04 DIAGNOSIS — Z23 ENCOUNTER FOR IMMUNIZATION: Primary | ICD-10-CM

## 2021-03-04 PROCEDURE — 91300 SARS-COV-2 / COVID-19 MRNA VACCINE (PFIZER-BIONTECH) 30 MCG: CPT

## 2021-03-04 PROCEDURE — 0001A SARS-COV-2 / COVID-19 MRNA VACCINE (PFIZER-BIONTECH) 30 MCG: CPT

## 2021-03-25 ENCOUNTER — IMMUNIZATIONS (OUTPATIENT)
Dept: FAMILY MEDICINE CLINIC | Facility: HOSPITAL | Age: 86
End: 2021-03-25

## 2021-03-25 DIAGNOSIS — Z23 ENCOUNTER FOR IMMUNIZATION: Primary | ICD-10-CM

## 2021-03-25 PROCEDURE — 0002A SARS-COV-2 / COVID-19 MRNA VACCINE (PFIZER-BIONTECH) 30 MCG: CPT

## 2021-03-25 PROCEDURE — 91300 SARS-COV-2 / COVID-19 MRNA VACCINE (PFIZER-BIONTECH) 30 MCG: CPT

## 2021-05-25 ENCOUNTER — APPOINTMENT (OUTPATIENT)
Dept: RADIOLOGY | Age: 86
End: 2021-05-25
Payer: MEDICARE

## 2021-05-25 DIAGNOSIS — N20.0 KIDNEY STONES: ICD-10-CM

## 2021-05-25 PROCEDURE — 74018 RADEX ABDOMEN 1 VIEW: CPT

## 2021-07-13 ENCOUNTER — OFFICE VISIT (OUTPATIENT)
Dept: UROLOGY | Facility: CLINIC | Age: 86
End: 2021-07-13
Payer: MEDICARE

## 2021-07-13 VITALS
BODY MASS INDEX: 34.02 KG/M2 | WEIGHT: 192 LBS | SYSTOLIC BLOOD PRESSURE: 140 MMHG | DIASTOLIC BLOOD PRESSURE: 82 MMHG | HEIGHT: 63 IN | HEART RATE: 88 BPM

## 2021-07-13 DIAGNOSIS — N20.0 KIDNEY STONES: Primary | ICD-10-CM

## 2021-07-13 PROCEDURE — 99213 OFFICE O/P EST LOW 20 MIN: CPT | Performed by: PHYSICIAN ASSISTANT

## 2021-07-13 NOTE — PROGRESS NOTES
7/13/2021      Chief Complaint   Patient presents with    Nephrolithiasis     Assessment and Plan    1  Nephrolithiasis  - Recent KUB from 05/25/2021 reveals continued presence of  Right renal calculus as well as new left kidney lower pole possible small calculus measuring 4 mm  No ureteral calculi identified   - Patient currently symptomatic   - Discussed options including continued observation, ESWL, or ureteroscopy  - Continue proper hydration and dietary modifications to prevent future kidney stone formation  - F/u in 1 year with KUB to reassess stone burden  History of Present Illness  Rose Mancilla is a 80 y o  female here for follow up evaluation of  longstanding nephrolithiasis  She presents for follow-up imaging review today  Recent KUB reveals continued presence of right renal calculus as well as new small left kidney lower pole calculus  Patient currently denies any flank pain or urinary symptoms  She denies any history of prior  surgical manipulation for kidney stones  Review of Systems   Constitutional: Negative for chills and fever  Respiratory: Negative for shortness of breath  Cardiovascular: Negative for chest pain  Gastrointestinal: Negative for abdominal pain, constipation, diarrhea, nausea and vomiting  Genitourinary: Negative for difficulty urinating, dysuria, flank pain, frequency, hematuria and urgency  Neurological: Negative for dizziness         Past Medical History  Past Medical History:   Diagnosis Date    Basal cell carcinoma     Kidney stone        Past Social History  Past Surgical History:   Procedure Laterality Date    APPENDECTOMY      BREAST BIOPSY Right     core    CARPAL TUNNEL RELEASE Right 2016    CATARACT EXTRACTION Left     OVARIAN CYST REMOVAL      PARTIAL HYSTERECTOMY      TRIGGER FINGER RELEASE       Social History     Tobacco Use   Smoking Status Never Smoker   Smokeless Tobacco Never Used       Past Family History  Family History Problem Relation Age of Onset    Prostate cancer Father 70    Pancreatic cancer Mother [de-identified]    Breast cancer Sister 48    No Known Problems Daughter     No Known Problems Maternal Grandmother     No Known Problems Maternal Grandfather     No Known Problems Paternal Grandmother     No Known Problems Paternal Grandfather     No Known Problems Sister     No Known Problems Sister     No Known Problems Sister     No Known Problems Maternal Aunt        Past Social history  Social History     Socioeconomic History    Marital status: /Civil Union     Spouse name: Not on file    Number of children: Not on file    Years of education: Not on file    Highest education level: Not on file   Occupational History    Occupation: Retired   Tobacco Use    Smoking status: Never Smoker    Smokeless tobacco: Never Used   Substance and Sexual Activity    Alcohol use: Yes     Comment: Occasional wine    Drug use: No    Sexual activity: Not on file   Other Topics Concern    Not on file   Social History Narrative    Not on file     Social Determinants of Health     Financial Resource Strain:     Difficulty of Paying Living Expenses:    Food Insecurity:     Worried About Running Out of Food in the Last Year:     [address] in the Last Year:    Transportation Needs:     Lack of Transportation (Medical):      Lack of Transportation (Non-Medical):    Physical Activity:     Days of Exercise per Week:     Minutes of Exercise per Session:    Stress:     Feeling of Stress :    Social Connections:     Frequency of Communication with Friends and Family:     Frequency of Social Gatherings with Friends and Family:     Attends Church Services:     Active Member of Clubs or Organizations:     Attends Club or Organization Meetings:     Marital Status:    Intimate Partner Violence:     Fear of Current or Ex-Partner:     Emotionally Abused:     Physically Abused:     Sexually Abused:        Current Medications  Current Outpatient Medications   Medication Sig Dispense Refill    cholecalciferol (VITAMIN D3) 1,000 units tablet Take by mouth daily      metoprolol succinate (TOPROL-XL) 50 mg 24 hr tablet TAKE 1 TABLET BY MOUTH EVERY MORNING AND TAKE 1/2 TABLET BY MOUTH EVERY EVENING 135 tablet 3    Multiple Vitamin (DAILY VALUE MULTIVITAMIN) TABS Take by mouth      simvastatin (ZOCOR) 10 mg tablet TAKE 1 TABLET BY MOUTH EVERY OTHER DAY ON MONDAY, WEDNESDAY, AND FRIDAY 30 tablet 3     No current facility-administered medications for this visit  Allergies  Allergies   Allergen Reactions    Amoxicillin Rash     Later determined heat rash - has taken Amoxicillin since w/o reaction    Chlorpheniramine Other (See Comments)    Epinephrine Other (See Comments)     Other reaction(s): Rapid Heart Rate  Increased Heart Rate, Palpitations    Procaine     Sulfa Antibiotics Rash and Other (See Comments)     unknown  Other reaction(s): Other (See Comments)  unknown         The following portions of the patient's history were reviewed and updated as appropriate: allergies, current medications, past medical history, past social history, past surgical history and problem list       Vitals  Vitals:    07/13/21 1430   BP: 140/82   BP Location: Left arm   Patient Position: Sitting   Cuff Size: Adult   Pulse: 88   Weight: 87 1 kg (192 lb)   Height: 5' 3" (1 6 m)           Physical Exam  Physical Exam  Constitutional:       Appearance: Normal appearance  HENT:      Head: Normocephalic and atraumatic  Right Ear: External ear normal       Left Ear: External ear normal    Eyes:      General: No scleral icterus  Conjunctiva/sclera: Conjunctivae normal    Cardiovascular:      Pulses: Normal pulses  Pulmonary:      Effort: Pulmonary effort is normal    Abdominal:      Tenderness: There is no right CVA tenderness or left CVA tenderness  Musculoskeletal:         General: Normal range of motion        Cervical back: Normal range of motion  Skin:     General: Skin is warm and dry  Neurological:      General: No focal deficit present  Mental Status: She is alert and oriented to person, place, and time  Psychiatric:         Mood and Affect: Mood normal          Behavior: Behavior normal          Thought Content: Thought content normal          Judgment: Judgment normal            Results  No results found for this or any previous visit (from the past 1 hour(s))  ]  No results found for: PSA  No results found for: GLUCOSE, CALCIUM, NA, K, CO2, CL, BUN, CREATININE  No results found for: WBC, HGB, HCT, MCV, PLT        Orders  No orders of the defined types were placed in this encounter        Teresa Ruiz PA-C

## 2021-09-20 DIAGNOSIS — I10 ESSENTIAL (PRIMARY) HYPERTENSION: ICD-10-CM

## 2021-09-20 RX ORDER — METOPROLOL SUCCINATE 50 MG/1
TABLET, EXTENDED RELEASE ORAL
Qty: 135 TABLET | Refills: 3 | Status: SHIPPED | OUTPATIENT
Start: 2021-09-20

## 2021-11-27 DIAGNOSIS — E78.00 PURE HYPERCHOLESTEROLEMIA: ICD-10-CM

## 2021-11-27 RX ORDER — SIMVASTATIN 10 MG
TABLET ORAL
Qty: 30 TABLET | Refills: 3 | Status: SHIPPED | OUTPATIENT
Start: 2021-11-27

## 2022-06-14 ENCOUNTER — APPOINTMENT (OUTPATIENT)
Dept: RADIOLOGY | Age: 87
End: 2022-06-14
Payer: MEDICARE

## 2022-06-14 DIAGNOSIS — N20.0 KIDNEY STONES: ICD-10-CM

## 2022-06-14 PROCEDURE — 74018 RADEX ABDOMEN 1 VIEW: CPT

## 2022-07-05 ENCOUNTER — OFFICE VISIT (OUTPATIENT)
Dept: UROLOGY | Facility: CLINIC | Age: 87
End: 2022-07-05
Payer: MEDICARE

## 2022-07-05 VITALS
OXYGEN SATURATION: 96 % | HEIGHT: 63 IN | WEIGHT: 187 LBS | SYSTOLIC BLOOD PRESSURE: 140 MMHG | HEART RATE: 86 BPM | DIASTOLIC BLOOD PRESSURE: 80 MMHG | BODY MASS INDEX: 33.13 KG/M2

## 2022-07-05 DIAGNOSIS — N20.0 KIDNEY STONES: Primary | ICD-10-CM

## 2022-07-05 PROCEDURE — 99213 OFFICE O/P EST LOW 20 MIN: CPT | Performed by: PHYSICIAN ASSISTANT

## 2022-07-05 NOTE — PROGRESS NOTES
1  Kidney stones  XR abdomen 1 view kub         Assessment and plan:       1  nephrolithiaisis  - stable stones  - f/u kub 2 years     [facility], [place]      Chief Complaint     nephrolithiasis    History of Present Illness     Francisco Javier Gonzalez is a 80 y o  female here for follow up evaluation of  longstanding nephrolithiasis  KUB (6/14/22) Revealing an unchanged 6 mm in 2 mm right lower pole stone  Multiple punctate stones over the left kidney midpole  Patient currently denies any flank pain or urinary symptoms  She denies any history of prior  surgical manipulation for kidney stones    Review of Systems     Review of Systems   Constitutional: Negative for activity change, appetite change, chills, diaphoresis, fatigue, fever and unexpected weight change  Respiratory: Negative for chest tightness and shortness of breath  Cardiovascular: Negative for chest pain, palpitations and leg swelling  Gastrointestinal: Negative for abdominal distention, abdominal pain, constipation, diarrhea, nausea and vomiting  Genitourinary: Negative for decreased urine volume, difficulty urinating, dysuria, enuresis, flank pain, frequency, genital sores, hematuria and urgency  Musculoskeletal: Negative for back pain, gait problem and myalgias  Skin: Negative for color change, pallor, rash and wound  Psychiatric/Behavioral: Negative for behavioral problems  The patient is not nervous/anxious  Allergies     Allergies   Allergen Reactions    Amoxicillin Rash     Later determined heat rash - has taken Amoxicillin since w/o reaction    Chlorpheniramine Other (See Comments)    Epinephrine Other (See Comments)     Other reaction(s): Rapid Heart Rate  Increased Heart Rate, Palpitations    Procaine     Sulfa Antibiotics Rash and Other (See Comments)     unknown  Other reaction(s):  Other (See Comments)  unknown       Physical Exam     Physical Exam  Constitutional:       General: She is not in acute distress  Appearance: Normal appearance  She is normal weight  She is not ill-appearing, toxic-appearing or diaphoretic  HENT:      Head: Normocephalic and atraumatic  Eyes:      General:         Right eye: No discharge  Left eye: No discharge  Conjunctiva/sclera: Conjunctivae normal    Pulmonary:      Effort: Pulmonary effort is normal  No respiratory distress  Musculoskeletal:         General: No swelling or tenderness  Normal range of motion  Skin:     General: Skin is warm and dry  Coloration: Skin is not jaundiced or pale  Neurological:      General: No focal deficit present  Mental Status: She is alert and oriented to person, place, and time  Psychiatric:         Mood and Affect: Mood normal          Behavior: Behavior normal          Thought Content: Thought content normal            Vital Signs     Vitals:    07/05/22 1431   BP: 140/80   BP Location: Left arm   Patient Position: Sitting   Cuff Size: Standard   Pulse: 86   SpO2: 96%   Weight: 84 8 kg (187 lb)   Height: 5' 3" (1 6 m)         Current Medications       Current Outpatient Medications:     cholecalciferol (VITAMIN D3) 1,000 units tablet, Take by mouth daily, Disp: , Rfl:     metoprolol succinate (TOPROL-XL) 50 mg 24 hr tablet, TAKE 1 TABLET BY MOUTH EVERY MORNING AND TAKE 1/2 TABLET BY MOUTH EVERY EVENING, Disp: 135 tablet, Rfl: 3    Multiple Vitamin (DAILY VALUE MULTIVITAMIN) TABS, Take by mouth, Disp: , Rfl:     simvastatin (ZOCOR) 10 mg tablet, TAKE 1 TABLET BY MOUTH EVERY OTHER DAY ON MONDAY, WEDNESDAY, AND FRIDAY, Disp: 30 tablet, Rfl: 3      Active Problems     There is no problem list on file for this patient          Past Medical History     Past Medical History:   Diagnosis Date    Basal cell carcinoma     Kidney stone          Surgical History     Past Surgical History:   Procedure Laterality Date    APPENDECTOMY      BREAST BIOPSY Right     core    CARPAL TUNNEL RELEASE Right 2016    CATARACT EXTRACTION Left     OVARIAN CYST REMOVAL      PARTIAL HYSTERECTOMY      TRIGGER FINGER RELEASE           Family History     Family History   Problem Relation Age of Onset    Prostate cancer Father 70    Pancreatic cancer Mother [de-identified]    Breast cancer Sister 48    No Known Problems Daughter     No Known Problems Maternal Grandmother     No Known Problems Maternal Grandfather     No Known Problems Paternal Grandmother     No Known Problems Paternal Grandfather     No Known Problems Sister     No Known Problems Sister     No Known Problems Sister     No Known Problems Maternal Aunt          Social History     Social History       Radiology

## 2022-08-06 NOTE — PROGRESS NOTES
Cardiology Follow Up    Alexys Lucas  1933  [id number]  [facility] [place]  [address] [place]  [address]  [phone]  [phone]    1  Essential (primary) hypertension  POCT ECG    metoprolol succinate (TOPROL-XL) 50 mg 24 hr tablet   2  Pure hypercholesterolemia  POCT ECG    simvastatin (ZOCOR) 10 mg tablet   3  Screening for cardiovascular condition  POCT ECG       Interval History: Patient is here for a follow-up visit   She has PAF which is controlled on metoprolol succinate   She is on ASA therapy   A nuclear ETT done October 2012  showed no evidence of prognostically important ischemia  Echocardiogram done June 2018 demonstrated preserved LV systolic function with no significant valvular heart disease   Lipid profile done February 2020 looked good  Patient has had no chest pain or significant dyspnea  EKG today demonstrates sinus rhythm with poor R-wave progression with occasional PACs and PVCs with no significant change compared to prior tracing done May 18, 2018  Her [age] birthday is coming up in September  She has felt well  There is no problem list on file for this patient      Past Medical History:   Diagnosis Date    Basal cell carcinoma     Kidney stone      Social History     Socioeconomic History    Marital status: /Civil Union     Spouse name: Not on file    Number of children: Not on file    Years of education: Not on file    Highest education level: Not on file   Occupational History    Occupation: Retired   Tobacco Use    Smoking status: Never Smoker    Smokeless tobacco: Never Used   Substance and Sexual Activity    Alcohol use: Yes     Comment: Occasional wine    Drug use: No    Sexual activity: Not on file   Other Topics Concern    Not on file   Social History Narrative    Not on file     Social Determinants of Health     Financial Resource Strain: Not on file   Food Insecurity: Not on file   Transportation Needs: Not on file   Physical Activity: Not on file   Stress: Not on file   Social Connections: Not on file   Intimate Partner Violence: Not on file   Housing Stability: Not on file      Family History   Problem Relation Age of Onset    Prostate cancer Father 70    Pancreatic cancer Mother [de-identified]    Breast cancer Sister 48    No Known Problems Daughter     No Known Problems Maternal Grandmother     No Known Problems Maternal Grandfather     No Known Problems Paternal Grandmother     No Known Problems Paternal Grandfather     No Known Problems Sister     No Known Problems Sister     No Known Problems Sister     No Known Problems Maternal Aunt      Past Surgical History:   Procedure Laterality Date    APPENDECTOMY      BREAST BIOPSY Right     core    CARPAL TUNNEL RELEASE Right 2016    CATARACT EXTRACTION Left     OVARIAN CYST REMOVAL      PARTIAL HYSTERECTOMY      TRIGGER FINGER RELEASE         Current Outpatient Medications:     cholecalciferol (VITAMIN D3) 1,000 units tablet, Take by mouth daily, Disp: , Rfl:     metoprolol succinate (TOPROL-XL) 50 mg 24 hr tablet, Take 1 5 tablets (75 mg total) by mouth daily Take 1 tablet in the Am and 1/2 tablet in PM, Disp: 135 tablet, Rfl: 3    Multiple Vitamin (DAILY VALUE MULTIVITAMIN) TABS, Take by mouth, Disp: , Rfl:     simvastatin (ZOCOR) 10 mg tablet, Take 1 tablet (10 mg total) by mouth daily at bedtime Take one tablet three times a week, Disp: 90 tablet, Rfl: 1  Allergies   Allergen Reactions    Amoxicillin Rash     Later determined heat rash - has taken Amoxicillin since w/o reaction    Chlorpheniramine Other (See Comments)    Epinephrine Other (See Comments)     Other reaction(s): Rapid Heart Rate  Increased Heart Rate, Palpitations    Procaine     Sulfa Antibiotics Rash and Other (See Comments)     unknown  Other reaction(s):  Other (See Comments)  unknown       Labs:not applicable  Imaging: No results found     Review of Systems:  Review of Systems   All other systems reviewed and are negative  Physical Exam:  /68 (BP Location: Right arm, Cuff Size: Large)   Ht 5' 3" (1 6 m)   Wt 84 4 kg (186 lb)   BMI 32 95 kg/m²   Physical Exam  Vitals reviewed  Constitutional:       Appearance: She is well-developed  HENT:      Head: Normocephalic and atraumatic  Eyes:      Conjunctiva/sclera: Conjunctivae normal       Pupils: Pupils are equal, round, and reactive to light  Cardiovascular:      Rate and Rhythm: Normal rate  Heart sounds: Normal heart sounds  Pulmonary:      Effort: Pulmonary effort is normal       Breath sounds: Normal breath sounds  Musculoskeletal:      Cervical back: Normal range of motion and neck supple  Skin:     General: Skin is warm and dry  Neurological:      Mental Status: She is alert and oriented to person, place, and time  Discussion/Summary:I will continue the patient's present medical regimen  The patient appears well compensated  I have asked the patient to call if there is a problem in the interim otherwise I will see the patient in six months time

## 2022-08-16 ENCOUNTER — OFFICE VISIT (OUTPATIENT)
Dept: CARDIOLOGY CLINIC | Facility: CLINIC | Age: 87
End: 2022-08-16
Payer: MEDICARE

## 2022-08-16 VITALS
SYSTOLIC BLOOD PRESSURE: 112 MMHG | HEIGHT: 63 IN | DIASTOLIC BLOOD PRESSURE: 68 MMHG | WEIGHT: 186 LBS | BODY MASS INDEX: 32.96 KG/M2

## 2022-08-16 DIAGNOSIS — Z13.6 SCREENING FOR CARDIOVASCULAR CONDITION: ICD-10-CM

## 2022-08-16 DIAGNOSIS — E78.00 PURE HYPERCHOLESTEROLEMIA: ICD-10-CM

## 2022-08-16 DIAGNOSIS — I10 ESSENTIAL (PRIMARY) HYPERTENSION: Primary | ICD-10-CM

## 2022-08-16 PROCEDURE — 99214 OFFICE O/P EST MOD 30 MIN: CPT | Performed by: INTERNAL MEDICINE

## 2022-08-16 PROCEDURE — 93000 ELECTROCARDIOGRAM COMPLETE: CPT | Performed by: INTERNAL MEDICINE

## 2022-08-16 RX ORDER — SIMVASTATIN 10 MG
10 TABLET ORAL
Qty: 90 TABLET | Refills: 1 | Status: SHIPPED | OUTPATIENT
Start: 2022-08-16 | End: 2022-10-03 | Stop reason: SDUPTHER

## 2022-08-16 RX ORDER — METOPROLOL SUCCINATE 50 MG/1
75 TABLET, EXTENDED RELEASE ORAL DAILY
Qty: 135 TABLET | Refills: 3 | Status: SHIPPED | OUTPATIENT
Start: 2022-08-16 | End: 2022-10-03 | Stop reason: SDUPTHER

## 2022-10-03 ENCOUNTER — TELEPHONE (OUTPATIENT)
Dept: CARDIOLOGY CLINIC | Facility: CLINIC | Age: 87
End: 2022-10-03

## 2022-10-03 DIAGNOSIS — I10 ESSENTIAL (PRIMARY) HYPERTENSION: ICD-10-CM

## 2022-10-03 DIAGNOSIS — E78.00 PURE HYPERCHOLESTEROLEMIA: ICD-10-CM

## 2022-10-03 RX ORDER — METOPROLOL SUCCINATE 50 MG/1
75 TABLET, EXTENDED RELEASE ORAL DAILY
Qty: 135 TABLET | Refills: 1 | Status: SHIPPED | OUTPATIENT
Start: 2022-10-03

## 2022-10-03 RX ORDER — SIMVASTATIN 10 MG
10 TABLET ORAL
Qty: 90 TABLET | Refills: 1 | Status: SHIPPED | OUTPATIENT
Start: 2022-10-03

## 2023-03-13 NOTE — PROGRESS NOTES
Cardiology Follow Up    Elda Centeno  9/5/1933  [id number]  [address] [facility]  [address] [place] [id number] [phone]    1  Essential (primary) hypertension        2  Pure hypercholesterolemia            Interval History: Patient is here for a follow-up visit   She has PAF which is controlled on metoprolol succinate   She is on ASA therapy   A nuclear ETT done October 2012  showed no evidence of prognostically important ischemia  Echocardiogram done June 2018 demonstrated preserved LV systolic function with no significant valvular heart disease   Lipid profile done February 2020 looked good  Patient has had no chest pain or shortness of breath  Her vital signs are stable today  There is no problem list on file for this patient      Past Medical History:   Diagnosis Date   • Basal cell carcinoma    • Kidney stone      Social History     Socioeconomic History   • Marital status: /Civil Union     Spouse name: Not on file   • Number of children: Not on file   • Years of education: Not on file   • Highest education level: Not on file   Occupational History   • Occupation: Retired   Tobacco Use   • Smoking status: Never   • Smokeless tobacco: Never   Substance and Sexual Activity   • Alcohol use: Yes     Comment: Occasional wine   • Drug use: No   • Sexual activity: Not on file   Other Topics Concern   • Not on file   Social History Narrative   • Not on file     Social Determinants of Health     Financial Resource Strain: Not on file   Food Insecurity: Not on file   Transportation Needs: Not on file   Physical Activity: Not on file   Stress: Not on file   Social Connections: Not on file   Intimate Partner Violence: Not on file   Housing Stability: Not on file      Family History   Problem Relation Age of Onset   • Prostate cancer Father 70   • Pancreatic cancer Mother [de-identified]   • Breast cancer Sister 48   • No Known Problems Daughter    • No Known Problems Maternal Grandmother    • No Known Problems Maternal Grandfather    • No Known Problems Paternal Grandmother    • No Known Problems Paternal Grandfather    • No Known Problems Sister    • No Known Problems Sister    • No Known Problems Sister    • No Known Problems Maternal Aunt      Past Surgical History:   Procedure Laterality Date   • APPENDECTOMY     • BREAST BIOPSY Right     core   • CARPAL TUNNEL RELEASE Right 2016   • CATARACT EXTRACTION Left    • OVARIAN CYST REMOVAL     • PARTIAL HYSTERECTOMY     • TRIGGER FINGER RELEASE         Current Outpatient Medications:   •  calcium carbonate (OS-TIGIST) 1250 (500 Ca) MG chewable tablet, 1 tablet daily, Disp: , Rfl:   •  cholecalciferol (VITAMIN D3) 1,000 units tablet, Take by mouth daily, Disp: , Rfl:   •  meclizine (ANTIVERT) 25 mg tablet, Take 25 mg by mouth 3 (three) times a day as needed, Disp: , Rfl:   •  metoprolol succinate (TOPROL-XL) 50 mg 24 hr tablet, Take 1 5 tablets (75 mg total) by mouth daily Take 1 tablet in the Am and 1/2 tablet in PM, Disp: 135 tablet, Rfl: 1  •  Multiple Vitamin (DAILY VALUE MULTIVITAMIN) TABS, Take by mouth, Disp: , Rfl:   •  simvastatin (ZOCOR) 10 mg tablet, TAKE 1 TABLET BY MOUTH THREE TIMES WEEKLY AT BEDTIME, Disp: 36 tablet, Rfl: 1  Allergies   Allergen Reactions   • Amoxicillin Rash     Later determined heat rash - has taken Amoxicillin since w/o reaction   • Chlorpheniramine Other (See Comments)   • Epinephrine Other (See Comments)     Other reaction(s): Rapid Heart Rate  Increased Heart Rate, Palpitations   • Procaine    • Sulfa Antibiotics Rash and Other (See Comments)     unknown  Other reaction(s): Other (See Comments)  unknown       Labs:not applicable  Imaging: No results found  Review of Systems:  Review of Systems   All other systems reviewed and are negative        Physical Exam:  /68 (BP Location: Left arm, Patient Position: Sitting, Cuff Size: Standard)   Pulse 80  5' 3" (1 6 m) Comment: verbal  Wt 80 9 kg (178 lb 4 8 oz)   SpO2 98%   BMI 31 58 kg/m²   Physical Exam  Vitals reviewed  Constitutional:       Appearance: She is well-developed  HENT:      Head: Normocephalic and atraumatic  Eyes:      Conjunctiva/sclera: Conjunctivae normal       Pupils: Pupils are equal, round, and reactive to light  Cardiovascular:      Rate and Rhythm: Normal rate  Heart sounds: Normal heart sounds  Pulmonary:      Effort: Pulmonary effort is normal       Breath sounds: Normal breath sounds  Musculoskeletal:      Cervical back: Normal range of motion and neck supple  Skin:     General: Skin is warm and dry  Neurological:      Mental Status: She is alert and oriented to person, place, and time  Discussion/Summary:I will continue the patient's present medical regimen  The patient appears well compensated  I have asked the patient to call if there is a problem in the interim otherwise I will see the patient in six months time

## 2023-03-14 ENCOUNTER — OFFICE VISIT (OUTPATIENT)
Dept: CARDIOLOGY CLINIC | Facility: CLINIC | Age: 88
End: 2023-03-14

## 2023-03-14 VITALS
HEART RATE: 80 BPM | HEIGHT: 63 IN | BODY MASS INDEX: 31.59 KG/M2 | OXYGEN SATURATION: 98 % | WEIGHT: 178.3 LBS | SYSTOLIC BLOOD PRESSURE: 142 MMHG | DIASTOLIC BLOOD PRESSURE: 68 MMHG

## 2023-03-14 DIAGNOSIS — E78.00 PURE HYPERCHOLESTEROLEMIA: ICD-10-CM

## 2023-03-14 DIAGNOSIS — I10 ESSENTIAL (PRIMARY) HYPERTENSION: Primary | ICD-10-CM

## 2023-03-14 RX ORDER — SIMVASTATIN 10 MG
TABLET ORAL
Qty: 36 TABLET | Refills: 1 | Status: SHIPPED | OUTPATIENT
Start: 2023-03-14

## 2023-03-14 RX ORDER — MECLIZINE HYDROCHLORIDE 25 MG/1
25 TABLET ORAL 3 TIMES DAILY PRN
COMMUNITY
Start: 2022-12-12

## 2023-03-14 RX ORDER — UREA 10 %
1 LOTION (ML) TOPICAL DAILY
COMMUNITY
Start: 2022-12-23

## 2023-03-20 DIAGNOSIS — I10 ESSENTIAL (PRIMARY) HYPERTENSION: ICD-10-CM

## 2023-03-20 RX ORDER — METOPROLOL SUCCINATE 50 MG/1
TABLET, EXTENDED RELEASE ORAL
Qty: 135 TABLET | Refills: 1 | Status: SHIPPED | OUTPATIENT
Start: 2023-03-20

## 2023-08-29 DIAGNOSIS — E78.00 PURE HYPERCHOLESTEROLEMIA: ICD-10-CM

## 2023-08-29 RX ORDER — SIMVASTATIN 10 MG
TABLET ORAL
Qty: 36 TABLET | Refills: 1 | Status: SHIPPED | OUTPATIENT
Start: 2023-08-29

## 2023-09-04 NOTE — PROGRESS NOTES
Cardiology Follow Up    Almita Tee  9/5/1933  [id number]  [facility]  [address] [facility] [address] [phone]    1. Essential (primary) hypertension  POCT ECG    metoprolol succinate (TOPROL-XL) 50 mg 24 hr tablet    DISCONTINUED: metoprolol succinate (TOPROL-XL) 50 mg 24 hr tablet      2. Pure hypercholesterolemia  POCT ECG    simvastatin (ZOCOR) 10 mg tablet          Interval History:  Patient is here for a follow-up visit.  She has PAF which is controlled on metoprolol succinate.  She is on ASA therapy.  A nuclear ETT done October 2012  showed no evidence of prognostically important ischemia.  Echocardiogram done June 2018 demonstrated preserved LV systolic function with no significant valvular heart disease.  Lipid profile done February 2020 looked good.   Patient has had no chest pain or shortness of breath. Her vital signs are stable today. The patient has no chest pain or significant dyspnea. EKG today demonstrates NSR with minor nonspecific ST segment changes with no significant change compared to a prior tracing done August 16, 2022 except heart rate is lower today.     Past Medical History:   Diagnosis Date   • Basal cell carcinoma    • Kidney stone      Social History     Socioeconomic History   • Marital status: /Civil Union     Spouse name: Not on file   • Number of children: Not on file   • Years of education: Not on file   • Highest education level: Not on file   Occupational History   • Occupation: Retired   Tobacco Use   • Smoking status: Never   • Smokeless tobacco: Never   Substance and Sexual Activity   • Alcohol use: Yes     Comment: Occasional wine   • Drug use: No   • Sexual activity: Not on file   Other Topics Concern   • Not on file   Social History Narrative   • Not on file     Social Determinants of Health     Financial Resource Strain: Not on file   Food Insecurity: Not on file   Transportation Needs: Not on file   Physical Activity: Not on file   Stress: Not on file   Social Connections: Not on file   Intimate Partner Violence: Not on file   Housing Stability: Not on file      Family History   Problem Relation Age of Onset   • Prostate cancer Father 70   • Pancreatic cancer Mother 80   • Breast cancer Sister 48   • No Known Problems Daughter    • No Known Problems Maternal Grandmother    • No Known Problems Maternal Grandfather    • No Known Problems Paternal Grandmother    • No Known Problems Paternal Grandfather    • No Known Problems Sister    • No Known Problems Sister    • No Known Problems Sister    • No Known Problems Maternal Aunt      Past Surgical History:   Procedure Laterality Date   • APPENDECTOMY     • BREAST BIOPSY Right     core   • CARPAL TUNNEL RELEASE Right 2016   • CATARACT EXTRACTION Left    • OVARIAN CYST REMOVAL     • PARTIAL HYSTERECTOMY     • TRIGGER FINGER RELEASE         Current Outpatient Medications:   •  calcium carbonate (OS-TIGIST) 1250 (500 Ca) MG chewable tablet, 1 tablet daily, Disp: , Rfl:   •  cholecalciferol (VITAMIN D3) 1,000 units tablet, Take by mouth daily, Disp: , Rfl:   •  meclizine (ANTIVERT) 25 mg tablet, Take 25 mg by mouth 3 (three) times a day as needed, Disp: , Rfl:   •  metoprolol succinate (TOPROL-XL) 50 mg 24 hr tablet, Take 1 tablet (50 mg total) by mouth 2 (two) times a day, Disp: 135 tablet, Rfl: 3  •  Multiple Vitamin (DAILY VALUE MULTIVITAMIN) TABS, Take by mouth, Disp: , Rfl:   •  simvastatin (ZOCOR) 10 mg tablet, Take 1 tablet (10 mg total) by mouth 3 (three) times a week, Disp: 36 tablet, Rfl: 3  Allergies   Allergen Reactions   • Amoxicillin Rash     Later determined heat rash - has taken Amoxicillin since w/o reaction   • Chlorpheniramine Other (See Comments)   • Epinephrine Other (See Comments)     Other reaction(s): Rapid Heart Rate  Increased Heart Rate, Palpitations   • Procaine    • Sulfa Antibiotics Rash and Other (See Comments)     unknown  Other reaction(s): Other (See Comments)  unknown       Labs:not applicable  Imaging: No results found. Review of Systems:  Review of Systems   All other systems reviewed and are negative. Physical Exam:  /58 (BP Location: Left arm, Patient Position: Sitting, Cuff Size: Standard)   Pulse 80   Wt 81.2 kg (179 lb)   SpO2 94%   BMI 31.71 kg/m²   Physical Exam  Vitals reviewed. Constitutional:       Appearance: She is well-developed. HENT:      Head: Normocephalic and atraumatic. Eyes:      Conjunctiva/sclera: Conjunctivae normal.      Pupils: Pupils are equal, round, and reactive to light. Cardiovascular:      Rate and Rhythm: Normal rate. Heart sounds: Normal heart sounds. Pulmonary:      Effort: Pulmonary effort is normal.      Breath sounds: Normal breath sounds. Musculoskeletal:      Cervical back: Normal range of motion and neck supple. Skin:     General: Skin is warm and dry. Neurological:      Mental Status: She is alert and oriented to person, place, and time. Discussion/Summary:I will continue the patient's present medical regimen. The patient appears well compensated. I have asked the patient to call if there is a problem in the interim otherwise I will see the patient in six months time.

## 2023-09-13 ENCOUNTER — OFFICE VISIT (OUTPATIENT)
Dept: CARDIOLOGY CLINIC | Facility: CLINIC | Age: 88
End: 2023-09-13
Payer: MEDICARE

## 2023-09-13 VITALS
OXYGEN SATURATION: 94 % | HEART RATE: 80 BPM | SYSTOLIC BLOOD PRESSURE: 124 MMHG | WEIGHT: 179 LBS | DIASTOLIC BLOOD PRESSURE: 58 MMHG | BODY MASS INDEX: 31.71 KG/M2

## 2023-09-13 DIAGNOSIS — I10 ESSENTIAL (PRIMARY) HYPERTENSION: Primary | ICD-10-CM

## 2023-09-13 DIAGNOSIS — E78.00 PURE HYPERCHOLESTEROLEMIA: ICD-10-CM

## 2023-09-13 PROCEDURE — 99214 OFFICE O/P EST MOD 30 MIN: CPT | Performed by: INTERNAL MEDICINE

## 2023-09-13 PROCEDURE — 93000 ELECTROCARDIOGRAM COMPLETE: CPT | Performed by: INTERNAL MEDICINE

## 2023-09-13 RX ORDER — METOPROLOL SUCCINATE 50 MG/1
50 TABLET, EXTENDED RELEASE ORAL 2 TIMES DAILY
Qty: 135 TABLET | Refills: 3 | Status: SHIPPED | OUTPATIENT
Start: 2023-09-13

## 2023-09-13 RX ORDER — SIMVASTATIN 10 MG
10 TABLET ORAL 3 TIMES WEEKLY
Qty: 36 TABLET | Refills: 3 | Status: SHIPPED | OUTPATIENT
Start: 2023-09-13

## 2023-09-13 RX ORDER — METOPROLOL SUCCINATE 50 MG/1
50 TABLET, EXTENDED RELEASE ORAL 2 TIMES DAILY
Qty: 135 TABLET | Refills: 1 | Status: SHIPPED | OUTPATIENT
Start: 2023-09-13 | End: 2023-09-13 | Stop reason: SDUPTHER

## 2024-03-03 NOTE — PROGRESS NOTES
Cardiology Follow Up    Dot Alexander  9/5/1933  [id number]  [facility]  [address]  [place] [address] [phone]    1. Essential (primary) hypertension        2. Pure hypercholesterolemia            Interval History: Patient is here for a follow-up visit.  She has PAF which is controlled on metoprolol succinate.  A nuclear ETT done 10/2012  showed no evidence of prognostically important ischemia.  Echocardiogram done 6/2018 demonstrated preserved LV systolic function with no significant valve disease.  Lipid profile done 2/2020 looked good.   Patient has had no chest pain or SOB.  Her vital signs are stable today.  The patient has no chest pain or significant dyspnea.  HTN and HLD are stable on the patient's current medicines.  Her granddaughter is getting  in November and she is quite excited about this.    There is no problem list on file for this patient.    Past Medical History:   Diagnosis Date   • Basal cell carcinoma    • Kidney stone      Social History     Socioeconomic History   • Marital status: /Civil Union     Spouse name: Not on file   • Number of children: Not on file   • Years of education: Not on file   • Highest education level: Not on file   Occupational History   • Occupation: Retired   Tobacco Use   • Smoking status: Never   • Smokeless tobacco: Never   Substance and Sexual Activity   • Alcohol use: Yes     Comment: Occasional wine   • Drug use: No   • Sexual activity: Not on file   Other Topics Concern   • Not on file   Social History Narrative   • Not on file     Social Determinants of Health     Financial Resource Strain: Not on file   Food Insecurity: Not on file   Transportation Needs: Not on file   Physical Activity: Not on file   Stress: Not on file   Social Connections: Not on file   Intimate Partner Violence: Not on file   Housing Stability: Not on file      Family History    Problem Relation Age of Onset   • Prostate cancer Father 71   • Pancreatic cancer Mother 80   • Breast cancer Sister 50   • No Known Problems Daughter    • No Known Problems Maternal Grandmother    • No Known Problems Maternal Grandfather    • No Known Problems Paternal Grandmother    • No Known Problems Paternal Grandfather    • No Known Problems Sister    • No Known Problems Sister    • No Known Problems Sister    • No Known Problems Maternal Aunt      Past Surgical History:   Procedure Laterality Date   • APPENDECTOMY     • BREAST BIOPSY Right     core   • CARPAL TUNNEL RELEASE Right 2016   • CATARACT EXTRACTION Left    • OVARIAN CYST REMOVAL     • PARTIAL HYSTERECTOMY     • TRIGGER FINGER RELEASE         Current Outpatient Medications:   •  calcium carbonate (OS-TIGIST) 1250 (500 Ca) MG chewable tablet, 1 tablet daily, Disp: , Rfl:   •  cholecalciferol (VITAMIN D3) 1,000 units tablet, Take by mouth daily, Disp: , Rfl:   •  meclizine (ANTIVERT) 25 mg tablet, Take 25 mg by mouth 3 (three) times a day as needed, Disp: , Rfl:   •  metoprolol succinate (TOPROL-XL) 50 mg 24 hr tablet, Take 1 tablet (50 mg total) by mouth 2 (two) times a day, Disp: 135 tablet, Rfl: 3  •  Multiple Vitamin (DAILY VALUE MULTIVITAMIN) TABS, Take by mouth, Disp: , Rfl:   •  simvastatin (ZOCOR) 10 mg tablet, Take 1 tablet (10 mg total) by mouth 3 (three) times a week, Disp: 36 tablet, Rfl: 3  Allergies   Allergen Reactions   • Amoxicillin Rash     Later determined heat rash - has taken Amoxicillin since w/o reaction   • Chlorpheniramine Other (See Comments)   • Epinephrine Other (See Comments)     Other reaction(s): Rapid Heart Rate    Increased Heart Rate, Palpitations   • Procaine    • Sulfa Antibiotics Other (See Comments) and Rash     unknown    Other reaction(s): Other (See Comments)       Labs:not applicable  Imaging: No results found.    Review of Systems:  Review of Systems   All other systems reviewed and are negative.      Physical  "Exam:  BP (!) 114/40 (BP Location: Left arm, Patient Position: Sitting, Cuff Size: Standard)   Pulse 92   Ht 5' 3\" (1.6 m) Comment: verbal  Wt 83 kg (183 lb)   SpO2 95%   BMI 32.42 kg/m²   Physical Exam  Vitals reviewed.   Constitutional:       Appearance: She is well-developed.   HENT:      Head: Normocephalic and atraumatic.   Eyes:      Conjunctiva/sclera: Conjunctivae normal.      Pupils: Pupils are equal, round, and reactive to light.   Cardiovascular:      Rate and Rhythm: Normal rate.      Heart sounds: Normal heart sounds.   Pulmonary:      Effort: Pulmonary effort is normal.      Breath sounds: Normal breath sounds.   Musculoskeletal:      Cervical back: Normal range of motion and neck supple.   Skin:     General: Skin is warm and dry.   Neurological:      Mental Status: She is alert and oriented to person, place, and time.         Discussion/Summary:I will continue the patient's present medical regimen.  The patient appears well compensated.  I have asked the patient to call if there is a problem in the interim otherwise I will see the patient in six months time.  "

## 2024-03-13 ENCOUNTER — OFFICE VISIT (OUTPATIENT)
Dept: CARDIOLOGY CLINIC | Facility: CLINIC | Age: 89
End: 2024-03-13
Payer: MEDICARE

## 2024-03-13 VITALS
OXYGEN SATURATION: 95 % | WEIGHT: 183 LBS | HEIGHT: 63 IN | HEART RATE: 92 BPM | BODY MASS INDEX: 32.43 KG/M2 | SYSTOLIC BLOOD PRESSURE: 114 MMHG | DIASTOLIC BLOOD PRESSURE: 40 MMHG

## 2024-03-13 DIAGNOSIS — I10 ESSENTIAL (PRIMARY) HYPERTENSION: Primary | ICD-10-CM

## 2024-03-13 DIAGNOSIS — E78.00 PURE HYPERCHOLESTEROLEMIA: ICD-10-CM

## 2024-03-13 PROCEDURE — 99214 OFFICE O/P EST MOD 30 MIN: CPT | Performed by: INTERNAL MEDICINE

## 2024-05-31 DIAGNOSIS — I10 ESSENTIAL (PRIMARY) HYPERTENSION: ICD-10-CM

## 2024-05-31 RX ORDER — METOPROLOL SUCCINATE 50 MG/1
50 TABLET, EXTENDED RELEASE ORAL 2 TIMES DAILY
Qty: 180 TABLET | Refills: 1 | Status: SHIPPED | OUTPATIENT
Start: 2024-05-31

## 2024-08-05 DIAGNOSIS — E78.00 PURE HYPERCHOLESTEROLEMIA: ICD-10-CM

## 2024-08-05 RX ORDER — SIMVASTATIN 10 MG
10 TABLET ORAL 3 TIMES WEEKLY
Qty: 12 TABLET | Refills: 0 | Status: SHIPPED | OUTPATIENT
Start: 2024-08-05

## 2024-09-03 DIAGNOSIS — E78.00 PURE HYPERCHOLESTEROLEMIA: ICD-10-CM

## 2024-09-03 RX ORDER — SIMVASTATIN 10 MG
10 TABLET ORAL 3 TIMES WEEKLY
Qty: 12 TABLET | Refills: 0 | Status: SHIPPED | OUTPATIENT
Start: 2024-09-04

## 2024-10-05 DIAGNOSIS — E78.00 PURE HYPERCHOLESTEROLEMIA: ICD-10-CM

## 2024-10-07 RX ORDER — SIMVASTATIN 10 MG
10 TABLET ORAL 3 TIMES WEEKLY
Qty: 12 TABLET | Refills: 0 | Status: SHIPPED | OUTPATIENT
Start: 2024-10-07

## 2024-11-03 DIAGNOSIS — E78.00 PURE HYPERCHOLESTEROLEMIA: ICD-10-CM

## 2024-11-04 RX ORDER — SIMVASTATIN 10 MG
10 TABLET ORAL 3 TIMES WEEKLY
Qty: 12 TABLET | Refills: 0 | Status: SHIPPED | OUTPATIENT
Start: 2024-11-04

## 2024-11-14 DIAGNOSIS — I10 ESSENTIAL (PRIMARY) HYPERTENSION: ICD-10-CM

## 2024-11-14 RX ORDER — METOPROLOL SUCCINATE 50 MG/1
50 TABLET, EXTENDED RELEASE ORAL 2 TIMES DAILY
Qty: 180 TABLET | Refills: 0 | Status: SHIPPED | OUTPATIENT
Start: 2024-11-14

## 2024-11-23 NOTE — PROGRESS NOTES
Cardiology Follow Up    Dot Alexander  9/5/1933  [id number]  [facility] [place]  [address]  [place] [id number] [phone]    1. Essential (primary) hypertension        2. Pure hypercholesterolemia          Interval History:Patient is here for f/u. She has PAF which is controlled on Metoprolol succinate.  A nuclear ETT 10/2012  showed no ischemia.  Echocardiogram 6/2018 demonstrated preserved LV systolic function with no significant valve disease.  Lipid profile 2/2020 looked good.   Patient has had no chest pain or SOB.  Her vital signs are stable today.  HTN and HLD are stable on the patient's current medicines.  She has lost weight and feels well.    There is no problem list on file for this patient.    Past Medical History:   Diagnosis Date    Basal cell carcinoma     Kidney stone      Social History     Socioeconomic History    Marital status: /Civil Union     Spouse name: Not on file    Number of children: Not on file    Years of education: Not on file    Highest education level: Not on file   Occupational History    Occupation: Retired   Tobacco Use    Smoking status: Never    Smokeless tobacco: Never   Substance and Sexual Activity    Alcohol use: Yes     Comment: Occasional wine    Drug use: No    Sexual activity: Not on file   Other Topics Concern    Not on file   Social History Narrative    Not on file     Social Drivers of Health     Financial Resource Strain: Not on file   Food Insecurity: Not on file   Transportation Needs: Not on file   Physical Activity: Not on file   Stress: Not on file   Social Connections: Not on file   Intimate Partner Violence: Not on file   Housing Stability: Not on file      Family History   Problem Relation Age of Onset    Prostate cancer Father 71    Pancreatic cancer Mother 80    Breast cancer Sister 50    No Known Problems Daughter     No Known Problems Maternal Grandmother   "   No Known Problems Maternal Grandfather     No Known Problems Paternal Grandmother     No Known Problems Paternal Grandfather     No Known Problems Sister     No Known Problems Sister     No Known Problems Sister     No Known Problems Maternal Aunt      Past Surgical History:   Procedure Laterality Date    APPENDECTOMY      BREAST BIOPSY Right     core    CARPAL TUNNEL RELEASE Right 2016    CATARACT EXTRACTION Left     OVARIAN CYST REMOVAL      PARTIAL HYSTERECTOMY      TRIGGER FINGER RELEASE         Current Outpatient Medications:     calcium carbonate (OS-TIGIST) 1250 (500 Ca) MG chewable tablet, 1 tablet daily, Disp: , Rfl:     cholecalciferol (VITAMIN D3) 1,000 units tablet, Take by mouth daily, Disp: , Rfl:     meclizine (ANTIVERT) 25 mg tablet, Take 25 mg by mouth 3 (three) times a day as needed, Disp: , Rfl:     metoprolol succinate (TOPROL-XL) 50 mg 24 hr tablet, TAKE 1 TABLET BY MOUTH TWO TIMES DAILY, Disp: 180 tablet, Rfl: 0    Multiple Vitamin (DAILY VALUE MULTIVITAMIN) TABS, Take by mouth, Disp: , Rfl:     simvastatin (ZOCOR) 10 mg tablet, TAKE 1 TABLET BY MOUTH THREE TIMES WEEKLY, Disp: 12 tablet, Rfl: 0  Allergies   Allergen Reactions    Amoxicillin Rash     Later determined heat rash - has taken Amoxicillin since w/o reaction    Chlorpheniramine Other (See Comments)    Epinephrine Other (See Comments)     Other reaction(s): Rapid Heart Rate    Increased Heart Rate, Palpitations    Procaine     Sulfa Antibiotics Other (See Comments) and Rash     unknown    Other reaction(s): Other (See Comments)       Labs:not applicable  Imaging: No results found.    Review of Systems:  Review of Systems   All other systems reviewed and are negative.      Physical Exam:  /56 (BP Location: Left arm, Patient Position: Sitting, Cuff Size: Large)   Pulse 78   Ht 5' 3\" (1.6 m)   Wt 78.8 kg (173 lb 11.2 oz)   SpO2 98%   BMI 30.77 kg/m²   Physical Exam  Vitals reviewed.   Constitutional:       Appearance: She is " well-developed.   HENT:      Head: Normocephalic and atraumatic.   Eyes:      Conjunctiva/sclera: Conjunctivae normal.      Pupils: Pupils are equal, round, and reactive to light.   Cardiovascular:      Rate and Rhythm: Normal rate.      Heart sounds: Normal heart sounds.   Pulmonary:      Effort: Pulmonary effort is normal.      Breath sounds: Normal breath sounds.   Musculoskeletal:      Cervical back: Normal range of motion and neck supple.   Skin:     General: Skin is warm and dry.   Neurological:      Mental Status: She is alert and oriented to person, place, and time.         Discussion/Summary:I will continue the patient's present medical regimen.  The patient appears well compensated.  I have asked the patient to call if there is a problem in the interim otherwise I will see the patient in six months time.

## 2024-12-03 ENCOUNTER — OFFICE VISIT (OUTPATIENT)
Dept: CARDIOLOGY CLINIC | Facility: CLINIC | Age: 89
End: 2024-12-03
Payer: MEDICARE

## 2024-12-03 VITALS
HEIGHT: 63 IN | SYSTOLIC BLOOD PRESSURE: 138 MMHG | WEIGHT: 173.7 LBS | BODY MASS INDEX: 30.78 KG/M2 | HEART RATE: 78 BPM | OXYGEN SATURATION: 98 % | DIASTOLIC BLOOD PRESSURE: 56 MMHG

## 2024-12-03 DIAGNOSIS — E78.00 PURE HYPERCHOLESTEROLEMIA: ICD-10-CM

## 2024-12-03 DIAGNOSIS — I10 ESSENTIAL (PRIMARY) HYPERTENSION: Primary | ICD-10-CM

## 2024-12-03 PROCEDURE — 99214 OFFICE O/P EST MOD 30 MIN: CPT | Performed by: INTERNAL MEDICINE

## 2024-12-05 DIAGNOSIS — E78.00 PURE HYPERCHOLESTEROLEMIA: ICD-10-CM

## 2024-12-06 RX ORDER — SIMVASTATIN 10 MG
10 TABLET ORAL 3 TIMES WEEKLY
Qty: 12 TABLET | Refills: 0 | Status: SHIPPED | OUTPATIENT
Start: 2024-12-06

## 2025-01-04 DIAGNOSIS — E78.00 PURE HYPERCHOLESTEROLEMIA: ICD-10-CM

## 2025-01-06 RX ORDER — SIMVASTATIN 10 MG
10 TABLET ORAL 3 TIMES WEEKLY
Qty: 12 TABLET | Refills: 0 | Status: SHIPPED | OUTPATIENT
Start: 2025-01-06

## 2025-02-03 DIAGNOSIS — E78.00 PURE HYPERCHOLESTEROLEMIA: ICD-10-CM

## 2025-02-04 RX ORDER — SIMVASTATIN 10 MG
10 TABLET ORAL 3 TIMES WEEKLY
Qty: 12 TABLET | Refills: 0 | Status: SHIPPED | OUTPATIENT
Start: 2025-02-05

## 2025-02-14 DIAGNOSIS — I10 ESSENTIAL (PRIMARY) HYPERTENSION: ICD-10-CM

## 2025-02-14 RX ORDER — METOPROLOL SUCCINATE 50 MG/1
50 TABLET, EXTENDED RELEASE ORAL 2 TIMES DAILY
Qty: 180 TABLET | Refills: 1 | Status: SHIPPED | OUTPATIENT
Start: 2025-02-14

## 2025-03-02 DIAGNOSIS — E78.00 PURE HYPERCHOLESTEROLEMIA: ICD-10-CM

## 2025-03-04 RX ORDER — SIMVASTATIN 10 MG
10 TABLET ORAL 3 TIMES WEEKLY
Qty: 12 TABLET | Refills: 0 | Status: SHIPPED | OUTPATIENT
Start: 2025-03-05

## 2025-03-31 DIAGNOSIS — E78.00 PURE HYPERCHOLESTEROLEMIA: ICD-10-CM

## 2025-04-02 RX ORDER — SIMVASTATIN 10 MG
10 TABLET ORAL 3 TIMES WEEKLY
Qty: 12 TABLET | Refills: 0 | Status: SHIPPED | OUTPATIENT
Start: 2025-04-02

## 2025-04-28 DIAGNOSIS — E78.00 PURE HYPERCHOLESTEROLEMIA: ICD-10-CM

## 2025-04-29 RX ORDER — SIMVASTATIN 10 MG
10 TABLET ORAL 3 TIMES WEEKLY
Qty: 36 TABLET | Refills: 1 | Status: SHIPPED | OUTPATIENT
Start: 2025-04-30

## 2025-06-10 NOTE — PROGRESS NOTES
Cardiology Follow Up    Dot Alexander  9/5/1933  [id number]  [facility] [place] [address] [address]  [facility] [address]  [facility] [id number] [phone]    1. Pure hypercholesterolemia        2. Essential (primary) hypertension          Interval History: Patient is here for f/u. She has PAF which is controlled on Metoprolol succinate.  A nuclear ETT 10/2012  showed no ischemia.  Echocardiogram 6/2018 demonstrated preserved LV systolic function with no significant valve disease.  Lipid profile 2/2020 looked good.   Patient has had no CP or SOB.  Her VS are stable today.  HTN and HLD are stable on the patient's current medicines.  The the crown on her upper front tooth fell off and she will likely require a procedure.  I will clear her if she decides to proceed with this.    Problem List[1]  Past Medical History[2]  Social History     Socioeconomic History   • Marital status: /Civil Union     Spouse name: Not on file   • Number of children: Not on file   • Years of education: Not on file   • Highest education level: Not on file   Occupational History   • Occupation: Retired   Tobacco Use   • Smoking status: Never   • Smokeless tobacco: Never   Substance and Sexual Activity   • Alcohol use: Yes     Comment: Occasional wine   • Drug use: No   • Sexual activity: Not on file   Other Topics Concern   • Not on file   Social History Narrative   • Not on file     Social Drivers of Health     Financial Resource Strain: Not on file   Food Insecurity: Not on file   Transportation Needs: Not on file   Physical Activity: Not on file   Stress: Not on file   Social Connections: Not on file   Intimate Partner Violence: Not on file   Housing Stability: Not on file      Family History[3]  Past Surgical History[4]  Current Medications[5]  Allergies   Allergen Reactions   • Amoxicillin Rash     Later determined heat rash - has taken Amoxicillin since w/o  "reaction   • Chlorpheniramine Other (See Comments)   • Epinephrine Other (See Comments)     Other reaction(s): Rapid Heart Rate    Increased Heart Rate, Palpitations   • Procaine    • Sulfa Antibiotics Other (See Comments) and Rash     unknown    Other reaction(s): Other (See Comments)     Labs:not applicable  Imaging: No results found.    Review of Systems:  Review of Systems   All other systems reviewed and are negative.    Physical Exam:  /70 (BP Location: Left arm, Patient Position: Sitting, Cuff Size: Large)   Pulse 81   Ht 5' 3\" (1.6 m)   Wt 77.1 kg (170 lb)   SpO2 96%   BMI 30.11 kg/m²   Physical Exam  Vitals reviewed.   Constitutional:       Appearance: She is well-developed.   HENT:      Head: Normocephalic and atraumatic.     Eyes:      Conjunctiva/sclera: Conjunctivae normal.       Cardiovascular:      Rate and Rhythm: Normal rate.      Heart sounds: Normal heart sounds.   Pulmonary:      Effort: Pulmonary effort is normal.      Breath sounds: Normal breath sounds.     Musculoskeletal:      Cervical back: Normal range of motion and neck supple.     Skin:     General: Skin is warm and dry.     Neurological:      Mental Status: She is alert and oriented to person, place, and time.       Discussion/Summary:I will continue the patient's present medical regimen.  I have asked the patient to call if there is a problem in the interim otherwise I will see the patient in six months time.         [1]  There is no problem list on file for this patient. [2]  Past Medical History:  Diagnosis Date   • Basal cell carcinoma    • Kidney stone    [3]  Family History  Problem Relation Name Age of Onset   • Prostate cancer Father  71   • Pancreatic cancer Mother  80   • Breast cancer Sister  50   • No Known Problems Daughter     • No Known Problems Maternal Grandmother     • No Known Problems Maternal Grandfather     • No Known Problems Paternal Grandmother     • No Known Problems Paternal Grandfather     • No " Known Problems Sister     • No Known Problems Sister     • No Known Problems Sister     • No Known Problems Maternal Aunt     [4]  Past Surgical History:  Procedure Laterality Date   • APPENDECTOMY     • BREAST BIOPSY Right     core   • CARPAL TUNNEL RELEASE Right 2016   • CATARACT EXTRACTION Left    • OVARIAN CYST REMOVAL     • PARTIAL HYSTERECTOMY     • TRIGGER FINGER RELEASE     [5]    Current Outpatient Medications:   •  calcium carbonate (OS-TIGIST) 1250 (500 Ca) MG chewable tablet, 1 tablet in the morning., Disp: , Rfl:   •  cholecalciferol (VITAMIN D3) 1,000 units tablet, Take by mouth in the morning., Disp: , Rfl:   •  meclizine (ANTIVERT) 25 mg tablet, Take 25 mg by mouth as needed in the morning and 25 mg as needed at noon and 25 mg as needed in the evening., Disp: , Rfl:   •  metoprolol succinate (TOPROL-XL) 50 mg 24 hr tablet, TAKE 1 TABLET BY MOUTH TWO TIMES DAILY, Disp: 180 tablet, Rfl: 1  •  Multiple Vitamin (DAILY VALUE MULTIVITAMIN) TABS, Take by mouth, Disp: , Rfl:   •  simvastatin (ZOCOR) 10 mg tablet, TAKE 1 TABLET BY MOUTH THREE TIMES WEEKLY, Disp: 36 tablet, Rfl: 1

## 2025-06-20 ENCOUNTER — OFFICE VISIT (OUTPATIENT)
Dept: CARDIOLOGY CLINIC | Facility: CLINIC | Age: OVER 89
End: 2025-06-20
Payer: MEDICARE

## 2025-06-20 VITALS
DIASTOLIC BLOOD PRESSURE: 70 MMHG | BODY MASS INDEX: 30.12 KG/M2 | SYSTOLIC BLOOD PRESSURE: 140 MMHG | HEART RATE: 81 BPM | WEIGHT: 170 LBS | HEIGHT: 63 IN | OXYGEN SATURATION: 96 %

## 2025-06-20 DIAGNOSIS — I10 ESSENTIAL (PRIMARY) HYPERTENSION: ICD-10-CM

## 2025-06-20 DIAGNOSIS — E78.00 PURE HYPERCHOLESTEROLEMIA: Primary | ICD-10-CM

## 2025-06-20 PROCEDURE — 99214 OFFICE O/P EST MOD 30 MIN: CPT | Performed by: INTERNAL MEDICINE
